# Patient Record
Sex: FEMALE | Race: WHITE | NOT HISPANIC OR LATINO | URBAN - METROPOLITAN AREA
[De-identification: names, ages, dates, MRNs, and addresses within clinical notes are randomized per-mention and may not be internally consistent; named-entity substitution may affect disease eponyms.]

---

## 2022-07-09 ENCOUNTER — TELEPHONE ENCOUNTER (OUTPATIENT)
Dept: URBAN - METROPOLITAN AREA CLINIC 121 | Facility: CLINIC | Age: 82
End: 2022-07-09

## 2022-07-09 RX ORDER — UBIDECARENONE 200 MG
CAPSULE ORAL
Refills: 0 | OUTPATIENT
Start: 2018-06-04 | End: 2018-12-04

## 2022-07-09 RX ORDER — OMEPRAZOLE 20 MG/1
USES ONLY WHEN TRAVELING CAPSULE, DELAYED RELEASE ORAL
Refills: 0 | OUTPATIENT
Start: 2016-09-04 | End: 2018-08-27

## 2022-07-09 RX ORDER — OMEPRAZOLE 40 MG/1
TWICE A DAY CAPSULE, DELAYED RELEASE ORAL TWICE A DAY
Refills: 1 | OUTPATIENT
Start: 2018-07-11 | End: 2018-08-27

## 2022-07-09 RX ORDER — MONTELUKAST 10 MG/1
TABLET, FILM COATED ORAL
Refills: 0 | OUTPATIENT
Start: 2018-06-04 | End: 2018-12-04

## 2022-07-09 RX ORDER — NAPROXEN SODIUM 220 MG/1
TABLET ORAL
Refills: 0 | OUTPATIENT
Start: 2018-06-04 | End: 2018-12-04

## 2022-07-09 RX ORDER — OMEPRAZOLE 20 MG/1
USES FAMOTIDINE WHEN TRAVELING 20MG TWICE DAILY TABLET, DELAYED RELEASE ORAL
Refills: 0 | OUTPATIENT
Start: 2018-06-04 | End: 2018-06-06

## 2022-07-09 RX ORDER — FAMOTIDINE 20 MG/1
EXCEPT WHEN TRAVELING TABLET ORAL
Refills: 0 | OUTPATIENT
Start: 2018-03-07 | End: 2018-12-04

## 2022-07-09 RX ORDER — ALBUTEROL SULFATE 90 UG/1
AEROSOL, METERED RESPIRATORY (INHALATION)
Refills: 0 | OUTPATIENT
Start: 2018-06-04 | End: 2018-12-04

## 2022-07-10 ENCOUNTER — TELEPHONE ENCOUNTER (OUTPATIENT)
Dept: URBAN - METROPOLITAN AREA CLINIC 121 | Facility: CLINIC | Age: 82
End: 2022-07-10

## 2022-07-10 RX ORDER — CHOLECALCIFEROL (VITAMIN D3) 50 MCG
TABLET ORAL
Refills: 0 | Status: ACTIVE | COMMUNITY
Start: 2018-06-04

## 2022-07-10 RX ORDER — OMEPRAZOLE 20 MG/1
CAPSULE, DELAYED RELEASE ORAL ONCE A DAY
Refills: 0 | Status: ACTIVE | COMMUNITY
Start: 2018-11-21

## 2022-07-10 RX ORDER — OMEPRAZOLE 40 MG/1
TWICE A DAY CAPSULE, DELAYED RELEASE ORAL TWICE A DAY
Refills: 1 | Status: ACTIVE | COMMUNITY
Start: 2018-08-27

## 2022-07-10 RX ORDER — HYDROCORTISONE ACETATE 0.5 %
CREAM (GRAM) TOPICAL TWICE A DAY
Refills: 0 | Status: ACTIVE | COMMUNITY
Start: 2018-06-04

## 2022-07-10 RX ORDER — SIMVASTATIN 10 MG/1
EVERY OTHER DAY TABLET, FILM COATED ORAL
Refills: 0 | Status: ACTIVE | COMMUNITY
Start: 2018-06-04

## 2022-07-10 RX ORDER — CLOBETASOL PROPIONATE 0.5 MG/G
CREAM TOPICAL
Refills: 0 | Status: ACTIVE | COMMUNITY
Start: 2018-06-04

## 2023-01-20 ENCOUNTER — OFFICE VISIT (OUTPATIENT)
Dept: URBAN - METROPOLITAN AREA CLINIC 63 | Facility: CLINIC | Age: 83
End: 2023-01-20
Payer: MEDICARE

## 2023-01-20 VITALS
OXYGEN SATURATION: 98 % | HEIGHT: 65 IN | BODY MASS INDEX: 27.99 KG/M2 | TEMPERATURE: 97.8 F | WEIGHT: 168 LBS | HEART RATE: 91 BPM | SYSTOLIC BLOOD PRESSURE: 122 MMHG | DIASTOLIC BLOOD PRESSURE: 80 MMHG

## 2023-01-20 DIAGNOSIS — D50.9 IRON DEFICIENCY ANEMIA, UNSPECIFIED IRON DEFICIENCY ANEMIA TYPE: ICD-10-CM

## 2023-01-20 PROCEDURE — 99204 OFFICE O/P NEW MOD 45 MIN: CPT | Performed by: NURSE PRACTITIONER

## 2023-01-20 RX ORDER — ALENDRONATE SODIUM 70 MG/1
1 TABLET 30 MINUTES BEFORE THE FIRST FOOD, BEVERAGE OR MEDICINE OF THE DAY WITH PLAIN WATER TABLET ORAL
Status: ACTIVE | COMMUNITY

## 2023-01-20 RX ORDER — OMEPRAZOLE 40 MG/1
TWICE A DAY CAPSULE, DELAYED RELEASE ORAL TWICE A DAY
Refills: 1 | COMMUNITY
Start: 2018-08-27

## 2023-01-20 RX ORDER — OMEPRAZOLE 20 MG/1
CAPSULE, DELAYED RELEASE ORAL ONCE A DAY
Refills: 0 | COMMUNITY
Start: 2018-11-21

## 2023-01-20 RX ORDER — SIMVASTATIN 10 MG/1
EVERY OTHER DAY TABLET, FILM COATED ORAL
Refills: 0 | COMMUNITY
Start: 2018-06-04

## 2023-01-20 RX ORDER — ONDANSETRON HYDROCHLORIDE 4 MG/1
1 TABLET TABLET, FILM COATED ORAL
Qty: 2 | Refills: 0 | OUTPATIENT
Start: 2023-01-20

## 2023-01-20 RX ORDER — HYDROCORTISONE ACETATE 0.5 %
CREAM (GRAM) TOPICAL TWICE A DAY
Refills: 0 | COMMUNITY
Start: 2018-06-04

## 2023-01-20 RX ORDER — CHOLECALCIFEROL (VITAMIN D3) 50 MCG
TABLET ORAL
Refills: 0 | COMMUNITY
Start: 2018-06-04

## 2023-01-20 RX ORDER — CLOBETASOL PROPIONATE 0.5 MG/G
CREAM TOPICAL
Refills: 0 | COMMUNITY
Start: 2018-06-04

## 2023-01-20 NOTE — HPI-PREVIOUS PROCEDURES
EGD/31 October 2018.  Unremarkable esophagus.  An 8 cm hiatal hernia was present.  Mild gastritis found in the gastric antrum with a few small sessile fundal foveal polyps found in the gastric fundus.  Unremarkable duodenum.  Pathology demonstrates reactive gastropathy in the antral biopsy and reactive change, consistent with reflux in the esophageal biopsy with all remaining findings negative or benign. ********** EGD with dilation/11 July 2018.  LA grade D esophagitis present in the lower third.  Esophageal stricture dilated with a 54 [place] Gallagher dilator, encountering moderate resistance.  8 cm hiatal hernia was present.  A few incidental sessile polyps were found in the gastric fundus.  Mild gastritis found in the gastric antrum.  Unremarkable duodenum.  Pathology demonstrates reactive gastropathy in the antral biopsy and esophagitis, favor reflux induced in the lower third esophageal biopsy.  All remaining findings are negative or benign. ********** Colonoscopy/11 July 2018.  One small angioectasia was found in the ascending colon.  Diverticulosis in the descending and sigmoid colon with internal hemorrhoids present.  No specimens collected.

## 2023-01-20 NOTE — HPI-PREVIOUS LABS
Lab work dated 04 January 2023 demonstrates the following abnormalities: MCH 26.3, RDW 21.0%, folate >24.20.  All remaining lab values of CBC, B12, TSH, CEA and ferritin are within normal limits. ********** Lab work dated 30 November 2022 demonstrates the following abnormalities: Iron 21, UIBC 605, TIBC 626, hemoglobin 10.3, hematocrit 33.7%, MCV 76.8, MCH 23.5, MCHC 30.6, RDW 15.6%, monocytes 10.6%.  All remaining lab values of CBC, CMP and iron studies are within normal limits.

## 2023-01-20 NOTE — HPI-TODAY'S VISIT:
Thank you very much for kindly referring Sallie William, a very pleasant 82-year-old female, back to our service due to iron deficiency anemia.  Past medical history significant for right breast cancer (stage Ia, pT1c, pN0, cM0), pulmonary nodules, HLD and cataracts.  Past surgical history significant for appendectomy, herniorrhaphy, right lumpectomy and tonsillectomy.  Her last EGD and colonoscopy was in 2018 (see results below).  Sallie presents today without gastrointestinal complaint and uses omeprazole, 40 mg, once daily with good efficacy.  She denies pyrosis, dysphagia, dyspepsia, abdominal pain, change in bowel habits, rectal bleeding, melena or unintentional weight loss.  She has been kindly referred to our service by Dr. Marrero due to an iron deficiency anemia that has since resolved after 1 month of oral iron supplementation.

## 2023-01-27 ENCOUNTER — LAB OUTSIDE AN ENCOUNTER (OUTPATIENT)
Dept: URBAN - METROPOLITAN AREA CLINIC 63 | Facility: CLINIC | Age: 83
End: 2023-01-27

## 2023-02-09 ENCOUNTER — OFFICE VISIT (OUTPATIENT)
Dept: URBAN - METROPOLITAN AREA SURGERY CENTER 4 | Facility: SURGERY CENTER | Age: 83
End: 2023-02-09
Payer: MEDICARE

## 2023-02-09 ENCOUNTER — CLAIMS CREATED FROM THE CLAIM WINDOW (OUTPATIENT)
Dept: URBAN - METROPOLITAN AREA CLINIC 4 | Facility: CLINIC | Age: 83
End: 2023-02-09
Payer: MEDICARE

## 2023-02-09 DIAGNOSIS — K64.1 GRADE II INTERNAL HEMORRHOIDS: ICD-10-CM

## 2023-02-09 DIAGNOSIS — D50.9 IRON DEFICIENCY ANEMIA, UNSPECIFIED IRON DEFICIENCY ANEMIA TYPE: ICD-10-CM

## 2023-02-09 DIAGNOSIS — K31.7 POLYP OF STOMACH AND DUODENUM: ICD-10-CM

## 2023-02-09 DIAGNOSIS — K55.20 ANGIODYSPLASIA OF COLON WITHOUT HEMORRHAGE: ICD-10-CM

## 2023-02-09 DIAGNOSIS — K44.9 DIAPHRAGMATIC HERNIA WITHOUT OBSTRUCTION OR GANGRENE: ICD-10-CM

## 2023-02-09 DIAGNOSIS — K29.70 GASTRITIS, UNSPECIFIED, WITHOUT BLEEDING: ICD-10-CM

## 2023-02-09 DIAGNOSIS — K31.89 OTHER DISEASES OF STOMACH AND DUODENUM: ICD-10-CM

## 2023-02-09 DIAGNOSIS — K57.30 DIVERTCULOSIS OF LG INT W/O PERFORATION OR ABSCESS W/O BLEEDING: ICD-10-CM

## 2023-02-09 PROCEDURE — 45378 DIAGNOSTIC COLONOSCOPY: CPT | Performed by: INTERNAL MEDICINE

## 2023-02-09 PROCEDURE — 43239 EGD BIOPSY SINGLE/MULTIPLE: CPT | Performed by: INTERNAL MEDICINE

## 2023-02-09 PROCEDURE — 88312 SPECIAL STAINS GROUP 1: CPT | Performed by: PATHOLOGY

## 2023-02-09 PROCEDURE — 43239 EGD BIOPSY SINGLE/MULTIPLE: CPT | Performed by: CLINIC/CENTER

## 2023-02-09 PROCEDURE — 45378 DIAGNOSTIC COLONOSCOPY: CPT | Performed by: CLINIC/CENTER

## 2023-02-09 PROCEDURE — 88305 TISSUE EXAM BY PATHOLOGIST: CPT | Performed by: PATHOLOGY

## 2023-02-09 RX ORDER — CLOBETASOL PROPIONATE 0.5 MG/G
CREAM TOPICAL
Refills: 0 | COMMUNITY
Start: 2018-06-04

## 2023-02-09 RX ORDER — OMEPRAZOLE 40 MG/1
TWICE A DAY CAPSULE, DELAYED RELEASE ORAL TWICE A DAY
Refills: 1 | COMMUNITY
Start: 2018-08-27

## 2023-02-09 RX ORDER — OMEPRAZOLE 20 MG/1
CAPSULE, DELAYED RELEASE ORAL ONCE A DAY
Refills: 0 | COMMUNITY
Start: 2018-11-21

## 2023-02-09 RX ORDER — HYDROCORTISONE ACETATE 0.5 %
CREAM (GRAM) TOPICAL TWICE A DAY
Refills: 0 | COMMUNITY
Start: 2018-06-04

## 2023-02-09 RX ORDER — CHOLECALCIFEROL (VITAMIN D3) 50 MCG
TABLET ORAL
Refills: 0 | COMMUNITY
Start: 2018-06-04

## 2023-02-09 RX ORDER — SIMVASTATIN 10 MG/1
EVERY OTHER DAY TABLET, FILM COATED ORAL
Refills: 0 | COMMUNITY
Start: 2018-06-04

## 2023-02-09 RX ORDER — ONDANSETRON HYDROCHLORIDE 4 MG/1
1 TABLET TABLET, FILM COATED ORAL
Qty: 2 | Refills: 0 | Status: ACTIVE | COMMUNITY
Start: 2023-01-20

## 2023-02-09 RX ORDER — ALENDRONATE SODIUM 70 MG/1
1 TABLET 30 MINUTES BEFORE THE FIRST FOOD, BEVERAGE OR MEDICINE OF THE DAY WITH PLAIN WATER TABLET ORAL
Status: ACTIVE | COMMUNITY

## 2023-02-10 PROBLEM — 398050005 DIVERTICULAR DISEASE OF COLON: Status: ACTIVE | Noted: 2023-02-10

## 2023-03-06 ENCOUNTER — OFFICE VISIT (OUTPATIENT)
Dept: URBAN - METROPOLITAN AREA CLINIC 63 | Facility: CLINIC | Age: 83
End: 2023-03-06
Payer: MEDICARE

## 2023-03-06 ENCOUNTER — DASHBOARD ENCOUNTERS (OUTPATIENT)
Age: 83
End: 2023-03-06

## 2023-03-06 ENCOUNTER — WEB ENCOUNTER (OUTPATIENT)
Dept: URBAN - METROPOLITAN AREA CLINIC 57 | Facility: CLINIC | Age: 83
End: 2023-03-06

## 2023-03-06 VITALS
TEMPERATURE: 97.4 F | SYSTOLIC BLOOD PRESSURE: 126 MMHG | OXYGEN SATURATION: 98 % | BODY MASS INDEX: 27.82 KG/M2 | HEART RATE: 86 BPM | HEIGHT: 65 IN | DIASTOLIC BLOOD PRESSURE: 68 MMHG | WEIGHT: 167 LBS

## 2023-03-06 DIAGNOSIS — K21.00 GASTRO-ESOPHAGEAL REFLUX DISEASE WITH ESOPHAGITIS, WITHOUT BLEEDING: ICD-10-CM

## 2023-03-06 DIAGNOSIS — K57.90 DIVERTICULOSIS: ICD-10-CM

## 2023-03-06 DIAGNOSIS — K64.1 GRADE II HEMORRHOIDS: ICD-10-CM

## 2023-03-06 DIAGNOSIS — K44.9 HIATAL HERNIA: ICD-10-CM

## 2023-03-06 DIAGNOSIS — D50.9 IRON DEFICIENCY ANEMIA, UNSPECIFIED IRON DEFICIENCY ANEMIA TYPE: ICD-10-CM

## 2023-03-06 DIAGNOSIS — K31.7 POLYP OF STOMACH AND DUODENUM: ICD-10-CM

## 2023-03-06 DIAGNOSIS — K55.20 ANGIODYSPLASIA OF COLON: ICD-10-CM

## 2023-03-06 PROBLEM — 266433003: Status: ACTIVE | Noted: 2023-03-06

## 2023-03-06 PROBLEM — 87522002: Status: ACTIVE | Noted: 2023-01-20

## 2023-03-06 PROBLEM — 398050005 DIVERTICULAR DISEASE OF COLON: Status: ACTIVE | Noted: 2023-03-06

## 2023-03-06 PROCEDURE — 99214 OFFICE O/P EST MOD 30 MIN: CPT | Performed by: NURSE PRACTITIONER

## 2023-03-06 RX ORDER — ALENDRONATE SODIUM 70 MG/1
1 TABLET 30 MINUTES BEFORE THE FIRST FOOD, BEVERAGE OR MEDICINE OF THE DAY WITH PLAIN WATER TABLET ORAL
Status: ACTIVE | COMMUNITY

## 2023-03-06 RX ORDER — SIMVASTATIN 10 MG/1
EVERY OTHER DAY TABLET, FILM COATED ORAL
Refills: 0 | Status: ACTIVE | COMMUNITY
Start: 2018-06-04

## 2023-03-06 RX ORDER — OMEPRAZOLE 20 MG/1
CAPSULE, DELAYED RELEASE ORAL ONCE A DAY
Refills: 0 | Status: ACTIVE | COMMUNITY
Start: 2018-11-21

## 2023-03-06 RX ORDER — CLOBETASOL PROPIONATE 0.5 MG/G
CREAM TOPICAL
Refills: 0 | Status: ACTIVE | COMMUNITY
Start: 2018-06-04

## 2023-03-06 RX ORDER — ONDANSETRON HYDROCHLORIDE 4 MG/1
1 TABLET TABLET, FILM COATED ORAL
Qty: 2 | Refills: 0 | Status: ACTIVE | COMMUNITY
Start: 2023-01-20

## 2023-03-06 RX ORDER — HYDROCORTISONE ACETATE 0.5 %
CREAM (GRAM) TOPICAL TWICE A DAY
Refills: 0 | Status: ACTIVE | COMMUNITY
Start: 2018-06-04

## 2023-03-06 RX ORDER — CHOLECALCIFEROL (VITAMIN D3) 50 MCG
TABLET ORAL
Refills: 0 | Status: ACTIVE | COMMUNITY
Start: 2018-06-04

## 2023-03-06 NOTE — HPI-PREVIOUS PROCEDURES
EGD/09 February 2023.  Unremarkable esophagus.  8 cm hiatal hernia is present.  A few small sessile fundal foveal polyps found in the gastric fundus.  Minimal gastritis found in the gastric antrum.  Unremarkable duodenum.  Pathology demonstrate chemical-reactive gastropathy in the antral biopsy and reflux type changes in the lower third esophageal biopsy.  All remaining findings are negative or benign. ********** Colonoscopy/09 July 2023. One colonic angioectasia was found in the ascending colon.  Diverticulosis descending and sigmoid colon with internal hemorrhoids present.  The exam was otherwise without abnormality on direct and retroflexion views.  No specimens collected.  Repeat screening colonoscopy is not recommended due to age. ********** EGD/31 October 2018.  Unremarkable esophagus.  An 8 cm hiatal hernia was present.  Mild gastritis found in the gastric antrum with a few small sessile fundal foveal polyps found in the gastric fundus.  Unremarkable duodenum.  Pathology demonstrates reactive gastropathy in the antral biopsy and reactive change, consistent with reflux in the esophageal biopsy with all remaining findings negative or benign. ********** EGD with dilation/11 July 2018.  LA grade D esophagitis present in the lower third.  Esophageal stricture dilated with a 54 [place] Gallagher dilator, encountering moderate resistance.  8 cm hiatal hernia was present.  A few incidental sessile polyps were found in the gastric fundus.  Mild gastritis found in the gastric antrum.  Unremarkable duodenum.  Pathology demonstrates reactive gastropathy in the antral biopsy and esophagitis, favor reflux induced in the lower third esophageal biopsy.  All remaining findings are negative or benign. ********** Colonoscopy/11 July 2018.  One small angioectasia was found in the ascending colon.  Diverticulosis in the descending and sigmoid colon with internal hemorrhoids present.  No specimens collected.

## 2023-03-06 NOTE — HPI-TODAY'S VISIT:
Sallie William is a very pleasant 82-year-old female seen in follow-up of EGD and colonoscopy secondary to anemia (see results below).  She admits to tolerating the procedure very easily without any postprocedure complications.  Her bowel habits have returned to normal and she presents today without gastrointestinal complaint.  She continues to use omeprazole, 40 mg once daily with good efficacy.

## 2023-05-12 ENCOUNTER — TELEPHONE ENCOUNTER (OUTPATIENT)
Dept: URBAN - METROPOLITAN AREA CLINIC 63 | Facility: CLINIC | Age: 83
End: 2023-05-12

## 2023-05-17 LAB
ABSOLUTE BASOPHILS: 50
ABSOLUTE EOSINOPHILS: 206
ABSOLUTE LYMPHOCYTES: 1839
ABSOLUTE MONOCYTES: 618
ABSOLUTE NEUTROPHILS: 4388
BASOPHILS: 0.7
EOSINOPHILS: 2.9
FERRITIN, SERUM: 15
FOLATE (FOLIC ACID), SERUM: 16.4
HEMATOCRIT: 41
HEMOGLOBIN: 13.9
IRON BIND.CAP.(TIBC): 433
IRON SATURATION: 15
IRON: 65
LYMPHOCYTES: 25.9
MCH: 30.5
MCHC: 33.9
MCV: 89.9
MONOCYTES: 8.7
MPV: 10.7
NEUTROPHILS: 61.8
PLATELET COUNT: 226
RDW: 13.3
RED BLOOD CELL COUNT: 4.56
VITAMIN B12: 442
WHITE BLOOD CELL COUNT: 7.1

## 2023-07-13 ENCOUNTER — TELEPHONE ENCOUNTER (OUTPATIENT)
Dept: URBAN - METROPOLITAN AREA CLINIC 60 | Facility: CLINIC | Age: 83
End: 2023-07-13